# Patient Record
Sex: MALE | Race: WHITE | NOT HISPANIC OR LATINO | URBAN - NONMETROPOLITAN AREA
[De-identification: names, ages, dates, MRNs, and addresses within clinical notes are randomized per-mention and may not be internally consistent; named-entity substitution may affect disease eponyms.]

---

## 2021-04-13 ENCOUNTER — OFFICE VISIT (OUTPATIENT)
Dept: URBAN - NONMETROPOLITAN AREA CLINIC 2 | Facility: CLINIC | Age: 46
End: 2021-04-13
Payer: COMMERCIAL

## 2021-04-13 ENCOUNTER — LAB OUTSIDE AN ENCOUNTER (OUTPATIENT)
Dept: URBAN - NONMETROPOLITAN AREA CLINIC 2 | Facility: CLINIC | Age: 46
End: 2021-04-13

## 2021-04-13 DIAGNOSIS — R74.8 ABNORMAL LIVER ENZYMES: ICD-10-CM

## 2021-04-13 DIAGNOSIS — K76.0 FATTY LIVER: ICD-10-CM

## 2021-04-13 DIAGNOSIS — Z12.11 SCREENING FOR COLON CANCER: ICD-10-CM

## 2021-04-13 PROCEDURE — 99214 OFFICE O/P EST MOD 30 MIN: CPT | Performed by: INTERNAL MEDICINE

## 2021-04-13 RX ORDER — GEMFIBROZIL 600 MG/1
TAKE 1 TABLET (600 MG) BY ORAL ROUTE 2 TIMES PER DAY 30 MINUTES BEFORE MORNING AND EVENING MEAL TABLET, FILM COATED ORAL 2
Qty: 0 | Refills: 0 | Status: ON HOLD | COMMUNITY
Start: 1900-01-01

## 2021-04-13 RX ORDER — ALLOPURINOL 100 MG/1
TAKE 1 TABLET (100 MG) BY ORAL ROUTE ONCE DAILY TABLET ORAL 1
Qty: 0 | Refills: 0 | Status: ACTIVE | COMMUNITY
Start: 1900-01-01

## 2021-04-13 RX ORDER — LISINOPRIL 20 MG/1
TAKE 1 TABLET (20 MG) BY ORAL ROUTE ONCE DAILY TABLET ORAL 1
Qty: 0 | Refills: 0 | Status: ACTIVE | COMMUNITY
Start: 1900-01-01

## 2021-04-13 RX ORDER — SODIUM PICOSULFATE, MAGNESIUM OXIDE, AND ANHYDROUS CITRIC ACID 10; 3.5; 12 MG/160ML; G/160ML; G/160ML
160 ML LIQUID ORAL
Qty: 320 MILLILITER | Refills: 0 | OUTPATIENT
Start: 2021-04-13 | End: 2021-04-14

## 2021-04-13 NOTE — HPI-TODAY'S VISIT:
4-13-21 The patient presents today for follow-up of his elevated liver enzymes secondary to fatty liver.  Since our last visit, he has not had his blood work rechecked.  He has not lost any weight, and he is unsure if he thinks his liver enzymes have improved any.  He has been exercising.  We have discussed repeating his labs today, and he is willing to do this again today.  Overall, he is feeling well.  We have discussed his colonoscopy.  He is willing to proceed with this as well.  He denies any family history of colon cancer or colon polyps.  He denies any problems with his bowels.

## 2021-04-28 ENCOUNTER — OFFICE VISIT (OUTPATIENT)
Dept: URBAN - NONMETROPOLITAN AREA SURGERY CENTER 1 | Facility: SURGERY CENTER | Age: 46
End: 2021-04-28

## 2021-05-19 ENCOUNTER — OFFICE VISIT (OUTPATIENT)
Dept: URBAN - NONMETROPOLITAN AREA SURGERY CENTER 1 | Facility: SURGERY CENTER | Age: 46
End: 2021-05-19

## 2021-05-27 ENCOUNTER — TELEPHONE ENCOUNTER (OUTPATIENT)
Dept: URBAN - NONMETROPOLITAN AREA CLINIC 2 | Facility: CLINIC | Age: 46
End: 2021-05-27

## 2021-09-14 ENCOUNTER — OFFICE VISIT (OUTPATIENT)
Dept: URBAN - NONMETROPOLITAN AREA CLINIC 2 | Facility: CLINIC | Age: 46
End: 2021-09-14

## 2022-01-04 ENCOUNTER — LAB OUTSIDE AN ENCOUNTER (OUTPATIENT)
Dept: URBAN - NONMETROPOLITAN AREA CLINIC 2 | Facility: CLINIC | Age: 47
End: 2022-01-04

## 2022-01-04 ENCOUNTER — WEB ENCOUNTER (OUTPATIENT)
Dept: URBAN - NONMETROPOLITAN AREA CLINIC 2 | Facility: CLINIC | Age: 47
End: 2022-01-04

## 2022-01-04 ENCOUNTER — OFFICE VISIT (OUTPATIENT)
Dept: URBAN - NONMETROPOLITAN AREA CLINIC 2 | Facility: CLINIC | Age: 47
End: 2022-01-04
Payer: COMMERCIAL

## 2022-01-04 DIAGNOSIS — K76.0 FATTY LIVER: ICD-10-CM

## 2022-01-04 DIAGNOSIS — R74.8 ABNORMAL LIVER ENZYMES: ICD-10-CM

## 2022-01-04 DIAGNOSIS — K62.5 RECTAL BLEEDING: ICD-10-CM

## 2022-01-04 DIAGNOSIS — Z12.11 SCREENING FOR COLON CANCER: ICD-10-CM

## 2022-01-04 PROCEDURE — 99214 OFFICE O/P EST MOD 30 MIN: CPT | Performed by: INTERNAL MEDICINE

## 2022-01-04 RX ORDER — ALLOPURINOL 100 MG/1
TAKE 1 TABLET (100 MG) BY ORAL ROUTE ONCE DAILY TABLET ORAL 1
Qty: 0 | Refills: 0 | Status: ACTIVE | COMMUNITY
Start: 1900-01-01

## 2022-01-04 RX ORDER — LISINOPRIL 20 MG/1
TAKE 1 TABLET (20 MG) BY ORAL ROUTE ONCE DAILY TABLET ORAL 1
Qty: 0 | Refills: 0 | Status: ACTIVE | COMMUNITY
Start: 1900-01-01

## 2022-01-04 RX ORDER — GEMFIBROZIL 600 MG/1
TAKE 1 TABLET (600 MG) BY ORAL ROUTE 2 TIMES PER DAY 30 MINUTES BEFORE MORNING AND EVENING MEAL TABLET, FILM COATED ORAL 2
Qty: 0 | Refills: 0 | Status: ON HOLD | COMMUNITY
Start: 1900-01-01

## 2022-01-04 NOTE — HPI-TODAY'S VISIT:
4-13-21 The patient presents today for follow-up of his elevated liver enzymes secondary to fatty liver.  Since our last visit, he has not had his blood work rechecked.  He has not lost any weight, and he is unsure if he thinks his liver enzymes have improved any.  He has been exercising.  We have discussed repeating his labs today, and he is willing to do this again today.  Overall, he is feeling well.  We have discussed his colonoscopy.  He is willing to proceed with this as well.  He denies any family history of colon cancer or colon polyps.  He denies any problems with his bowels. 1/4/2022 The patient presents today for follow-up.  He started having some rectal bleeding at the beginning of the fall.  This lasted for approximately 10 days, and now it has resolved.  We did try to schedule his colonoscopy on his last visit, but his insurance would not cover screening before the age of 50.  He now has different insurance.  His bowels have been fairly regular.  He denies any blood in his stool currently.  He denies any diarrhea or constipation.  He also is due for blood work today due to his fatty liver.  He admits to drinking alcohol, and is worried his labs may be elevated.  We are going to check these today as well.  He will follow-up after his colonoscopy.  We have explained the risks and benefits of the procedure, and he does wish to proceed.

## 2022-01-05 ENCOUNTER — TELEPHONE ENCOUNTER (OUTPATIENT)
Dept: URBAN - METROPOLITAN AREA CLINIC 92 | Facility: CLINIC | Age: 47
End: 2022-01-05

## 2022-01-05 LAB
ALBUMIN: 3.9
ALKALINE PHOSPHATASE: 86
ALT (SGPT): 94
AST (SGOT): 96
BASO (ABSOLUTE): 0.1
BASOS: 1
BILIRUBIN, DIRECT: 0.16
BILIRUBIN, TOTAL: 0.6
BUN/CREATININE RATIO: 15
BUN: 14
CARBON DIOXIDE, TOTAL: 21
CHLORIDE: 99
CHOLESTEROL, TOTAL: 268
COMMENT:: (no result)
CREATININE: 0.95
EGFR IF AFRICN AM: 110
EGFR IF NONAFRICN AM: 95
EOS (ABSOLUTE): 0.1
EOS: 3
GLUCOSE: 130
HDL CHOLESTEROL: 21
HEMATOCRIT: 41.6
HEMATOLOGY COMMENTS:: (no result)
HEMOGLOBIN: 14.4
IMMATURE CELLS: (no result)
IMMATURE GRANS (ABS): 0.1
IMMATURE GRANULOCYTES: 2
LDL CHOL CALC (NIH): 163
LDL/HDL RATIO: 7.8
LYMPHS (ABSOLUTE): 1.1
LYMPHS: 21
MCH: 32
MCHC: 34.6
MCV: 92
MONOCYTES(ABSOLUTE): 0.5
MONOCYTES: 11
NEUTROPHILS (ABSOLUTE): 3
NEUTROPHILS: 62
NRBC: (no result)
PLATELETS: 286
POTASSIUM: 4.1
PROTEIN, TOTAL: 7
RBC: 4.5
RDW: 12.6
SODIUM: 138
TRIGLYCERIDES: 433
VLDL CHOLESTEROL CAL: 84
WBC: 4.9

## 2022-01-19 ENCOUNTER — OFFICE VISIT (OUTPATIENT)
Dept: URBAN - NONMETROPOLITAN AREA SURGERY CENTER 1 | Facility: SURGERY CENTER | Age: 47
End: 2022-01-19

## 2022-03-07 ENCOUNTER — OFFICE VISIT (OUTPATIENT)
Dept: URBAN - NONMETROPOLITAN AREA CLINIC 2 | Facility: CLINIC | Age: 47
End: 2022-03-07

## 2022-04-06 ENCOUNTER — OFFICE VISIT (OUTPATIENT)
Dept: URBAN - NONMETROPOLITAN AREA SURGERY CENTER 1 | Facility: SURGERY CENTER | Age: 47
End: 2022-04-06
Payer: COMMERCIAL

## 2022-04-06 ENCOUNTER — CLAIMS CREATED FROM THE CLAIM WINDOW (OUTPATIENT)
Dept: URBAN - METROPOLITAN AREA CLINIC 4 | Facility: CLINIC | Age: 47
End: 2022-04-06
Payer: COMMERCIAL

## 2022-04-06 DIAGNOSIS — D12.4 BENIGN NEOPLASM OF DESCENDING COLON: ICD-10-CM

## 2022-04-06 DIAGNOSIS — D12.2 ADENOMA OF ASCENDING COLON: ICD-10-CM

## 2022-04-06 DIAGNOSIS — K63.89 CYST OF DUODENUM: ICD-10-CM

## 2022-04-06 DIAGNOSIS — D12.4 ADENOMA OF DESCENDING COLON: ICD-10-CM

## 2022-04-06 DIAGNOSIS — D12.2 BENIGN NEOPLASM OF ASCENDING COLON: ICD-10-CM

## 2022-04-06 DIAGNOSIS — Z12.11 COLON CANCER SCREENING: ICD-10-CM

## 2022-04-06 PROCEDURE — G8907 PT DOC NO EVENTS ON DISCHARG: HCPCS | Performed by: INTERNAL MEDICINE

## 2022-04-06 PROCEDURE — 45385 COLONOSCOPY W/LESION REMOVAL: CPT | Performed by: INTERNAL MEDICINE

## 2022-04-06 PROCEDURE — 88305 TISSUE EXAM BY PATHOLOGIST: CPT | Performed by: PATHOLOGY

## 2022-04-13 ENCOUNTER — LAB OUTSIDE AN ENCOUNTER (OUTPATIENT)
Dept: URBAN - METROPOLITAN AREA CLINIC 92 | Facility: CLINIC | Age: 47
End: 2022-04-13

## 2022-06-24 ENCOUNTER — OFFICE VISIT (OUTPATIENT)
Dept: URBAN - NONMETROPOLITAN AREA CLINIC 2 | Facility: CLINIC | Age: 47
End: 2022-06-24

## 2022-10-25 ENCOUNTER — OFFICE VISIT (OUTPATIENT)
Dept: URBAN - NONMETROPOLITAN AREA CLINIC 2 | Facility: CLINIC | Age: 47
End: 2022-10-25
Payer: COMMERCIAL

## 2022-10-25 ENCOUNTER — TELEPHONE ENCOUNTER (OUTPATIENT)
Dept: URBAN - METROPOLITAN AREA CLINIC 92 | Facility: CLINIC | Age: 47
End: 2022-10-25

## 2022-10-25 VITALS
HEIGHT: 73 IN | TEMPERATURE: 97.8 F | HEART RATE: 69 BPM | BODY MASS INDEX: 32.47 KG/M2 | DIASTOLIC BLOOD PRESSURE: 78 MMHG | SYSTOLIC BLOOD PRESSURE: 123 MMHG | WEIGHT: 245 LBS

## 2022-10-25 DIAGNOSIS — Z12.11 SCREENING FOR COLON CANCER: ICD-10-CM

## 2022-10-25 DIAGNOSIS — K62.5 RECTAL BLEEDING: ICD-10-CM

## 2022-10-25 DIAGNOSIS — R74.8 ABNORMAL LIVER ENZYMES: ICD-10-CM

## 2022-10-25 DIAGNOSIS — K76.0 FATTY LIVER: ICD-10-CM

## 2022-10-25 DIAGNOSIS — Z86.010 PERSONAL HISTORY OF COLONIC POLYPS: ICD-10-CM

## 2022-10-25 PROBLEM — 428283002: Status: ACTIVE | Noted: 2022-10-25

## 2022-10-25 PROBLEM — 12063002: Status: ACTIVE | Noted: 2022-01-04

## 2022-10-25 PROCEDURE — 99214 OFFICE O/P EST MOD 30 MIN: CPT | Performed by: NURSE PRACTITIONER

## 2022-10-25 RX ORDER — ALLOPURINOL 100 MG/1
TAKE 1 TABLET (100 MG) BY ORAL ROUTE ONCE DAILY TABLET ORAL 1
Qty: 0 | Refills: 0 | Status: ACTIVE | COMMUNITY
Start: 1900-01-01

## 2022-10-25 RX ORDER — GEMFIBROZIL 600 MG/1
TAKE 1 TABLET (600 MG) BY ORAL ROUTE 2 TIMES PER DAY 30 MINUTES BEFORE MORNING AND EVENING MEAL TABLET, FILM COATED ORAL 2
Qty: 0 | Refills: 0 | Status: ON HOLD | COMMUNITY
Start: 1900-01-01

## 2022-10-25 RX ORDER — LISINOPRIL 20 MG/1
TAKE 1 TABLET (20 MG) BY ORAL ROUTE ONCE DAILY TABLET ORAL 1
Qty: 0 | Refills: 0 | Status: ACTIVE | COMMUNITY
Start: 1900-01-01

## 2022-10-25 NOTE — HPI-TODAY'S VISIT:
4-13-21 The patient presents today for follow-up of his elevated liver enzymes secondary to fatty liver.  Since our last visit, he has not had his blood work rechecked.  He has not lost any weight, and he is unsure if he thinks his liver enzymes have improved any.  He has been exercising.  We have discussed repeating his labs today, and he is willing to do this again today.  Overall, he is feeling well.  We have discussed his colonoscopy.  He is willing to proceed with this as well.  He denies any family history of colon cancer or colon polyps.  He denies any problems with his bowels. 1/4/2022 The patient presents today for follow-up.  He started having some rectal bleeding at the beginning of the fall.  This lasted for approximately 10 days, and now it has resolved.  We did try to schedule his colonoscopy on his last visit, but his insurance would not cover screening before the age of 50.  He now has different insurance.  His bowels have been fairly regular.  He denies any blood in his stool currently.  He denies any diarrhea or constipation.  He also is due for blood work today due to his fatty liver.  He admits to drinking alcohol, and is worried his labs may be elevated.  We are going to check these today as well.  He will follow-up after his colonoscopy.  We have explained the risks and benefits of the procedure, and he does wish to proceed. 10/25/2022 Mr. Bledsoe presents for follow-up of fatty liver.  Since his last visit he is lost approximately 10 pounds.  He had repeat labs done last month by Dr. De La O, he states that these has improved.  His colonoscopy reveals multiple tubular adenomas and hyperplastic polyps, he is due for repeat in 3 years.  Today he is doing well with no new GI complaints, he will continue to work on weight loss.  MB

## 2023-02-16 ENCOUNTER — LAB OUTSIDE AN ENCOUNTER (OUTPATIENT)
Dept: URBAN - METROPOLITAN AREA CLINIC 92 | Facility: CLINIC | Age: 48
End: 2023-02-16

## 2023-04-25 ENCOUNTER — OFFICE VISIT (OUTPATIENT)
Dept: URBAN - NONMETROPOLITAN AREA CLINIC 2 | Facility: CLINIC | Age: 48
End: 2023-04-25
Payer: COMMERCIAL

## 2023-04-25 ENCOUNTER — LAB OUTSIDE AN ENCOUNTER (OUTPATIENT)
Dept: URBAN - NONMETROPOLITAN AREA CLINIC 2 | Facility: CLINIC | Age: 48
End: 2023-04-25

## 2023-04-25 VITALS
HEIGHT: 73 IN | HEART RATE: 77 BPM | WEIGHT: 245 LBS | BODY MASS INDEX: 32.47 KG/M2 | SYSTOLIC BLOOD PRESSURE: 130 MMHG | DIASTOLIC BLOOD PRESSURE: 84 MMHG

## 2023-04-25 DIAGNOSIS — K76.0 FATTY LIVER: ICD-10-CM

## 2023-04-25 DIAGNOSIS — D50.8 OTHER IRON DEFICIENCY ANEMIA: ICD-10-CM

## 2023-04-25 DIAGNOSIS — R74.8 ABNORMAL LIVER ENZYMES: ICD-10-CM

## 2023-04-25 DIAGNOSIS — K62.5 RECTAL BLEEDING: ICD-10-CM

## 2023-04-25 DIAGNOSIS — Z86.010 PERSONAL HISTORY OF COLONIC POLYPS: ICD-10-CM

## 2023-04-25 PROCEDURE — 99214 OFFICE O/P EST MOD 30 MIN: CPT | Performed by: NURSE PRACTITIONER

## 2023-04-25 RX ORDER — LISINOPRIL 20 MG/1
TAKE 1 TABLET (20 MG) BY ORAL ROUTE ONCE DAILY TABLET ORAL 1
Qty: 0 | Refills: 0 | Status: ACTIVE | COMMUNITY
Start: 1900-01-01

## 2023-04-25 RX ORDER — ALLOPURINOL 100 MG/1
TAKE 1 TABLET (100 MG) BY ORAL ROUTE ONCE DAILY TABLET ORAL 1
Qty: 0 | Refills: 0 | Status: ACTIVE | COMMUNITY
Start: 1900-01-01

## 2023-04-25 RX ORDER — GEMFIBROZIL 600 MG/1
TAKE 1 TABLET (600 MG) BY ORAL ROUTE 2 TIMES PER DAY 30 MINUTES BEFORE MORNING AND EVENING MEAL TABLET, FILM COATED ORAL 2
Qty: 0 | Refills: 0 | Status: ON HOLD | COMMUNITY
Start: 1900-01-01

## 2023-04-25 NOTE — HPI-TODAY'S VISIT:
4-13-21 The patient presents today for follow-up of his elevated liver enzymes secondary to fatty liver.  Since our last visit, he has not had his blood work rechecked.  He has not lost any weight, and he is unsure if he thinks his liver enzymes have improved any.  He has been exercising.  We have discussed repeating his labs today, and he is willing to do this again today.  Overall, he is feeling well.  We have discussed his colonoscopy.  He is willing to proceed with this as well.  He denies any family history of colon cancer or colon polyps.  He denies any problems with his bowels. 1/4/2022 The patient presents today for follow-up.  He started having some rectal bleeding at the beginning of the fall.  This lasted for approximately 10 days, and now it has resolved.  We did try to schedule his colonoscopy on his last visit, but his insurance would not cover screening before the age of 50.  He now has different insurance.  His bowels have been fairly regular.  He denies any blood in his stool currently.  He denies any diarrhea or constipation.  He also is due for blood work today due to his fatty liver.  He admits to drinking alcohol, and is worried his labs may be elevated.  We are going to check these today as well.  He will follow-up after his colonoscopy.  We have explained the risks and benefits of the procedure, and he does wish to proceed. 10/25/2022 Mr. Bledsoe presents for follow-up of fatty liver.  Since his last visit he is lost approximately 10 pounds.  He had repeat labs done last month by Dr. De La O, he states that these has improved.  His colonoscopy reveals multiple tubular adenomas and hyperplastic polyps, he is due for repeat in 3 years.  Today he is doing well with no new GI complaints, he will continue to work on weight loss.  MB 4/25/2023 Graeme presents for follow-up of elevated liver enzymes.  Since his last visit he stopped drinking alcohol completely.  His liver enzymes were in the 200s and September of last year, they came down to the 90s in January.  He has been working on weight loss.  His bowels are moving regularly.  His colonoscopy last year revealed multiple tubular adenomas, he denies any reflux symptoms.  He was mildly anemic on last labs with active alcohol use.  Today he agrees to repeat labs, if he still is anemic he agrees to pursue EGD.  If his liver enzymes are still elevated we will pursue repeat ultrasound imaging of his liver.  For now have recommended that he continue to abstain from alcohol, if his liver enzymes are normal okay to use socially.  MB

## 2023-05-09 ENCOUNTER — WEB ENCOUNTER (OUTPATIENT)
Dept: URBAN - NONMETROPOLITAN AREA CLINIC 2 | Facility: CLINIC | Age: 48
End: 2023-05-09

## 2023-10-18 ENCOUNTER — CLAIMS CREATED FROM THE CLAIM WINDOW (OUTPATIENT)
Dept: URBAN - METROPOLITAN AREA TELEHEALTH 2 | Facility: TELEHEALTH | Age: 48
End: 2023-10-18
Payer: COMMERCIAL

## 2023-10-18 ENCOUNTER — DASHBOARD ENCOUNTERS (OUTPATIENT)
Age: 48
End: 2023-10-18

## 2023-10-18 DIAGNOSIS — R74.8 ABNORMAL LIVER ENZYMES: ICD-10-CM

## 2023-10-18 DIAGNOSIS — K76.0 FATTY LIVER: ICD-10-CM

## 2023-10-18 DIAGNOSIS — Z86.010 PERSONAL HISTORY OF COLONIC POLYPS: ICD-10-CM

## 2023-10-18 DIAGNOSIS — K62.5 RECTAL BLEEDING: ICD-10-CM

## 2023-10-18 DIAGNOSIS — D50.8 OTHER IRON DEFICIENCY ANEMIA: ICD-10-CM

## 2023-10-18 PROBLEM — 87522002: Status: ACTIVE | Noted: 2022-11-16

## 2023-10-18 PROCEDURE — 99442 PHONE E/M BY PHYS 11-20 MIN: CPT | Performed by: NURSE PRACTITIONER

## 2023-10-18 RX ORDER — GEMFIBROZIL 600 MG/1
TAKE 1 TABLET (600 MG) BY ORAL ROUTE 2 TIMES PER DAY 30 MINUTES BEFORE MORNING AND EVENING MEAL TABLET, FILM COATED ORAL 2
Qty: 0 | Refills: 0 | Status: ON HOLD | COMMUNITY
Start: 1900-01-01

## 2023-10-18 RX ORDER — ALLOPURINOL 100 MG/1
TAKE 1 TABLET (100 MG) BY ORAL ROUTE ONCE DAILY TABLET ORAL 1
Qty: 0 | Refills: 0 | Status: ACTIVE | COMMUNITY
Start: 1900-01-01

## 2023-10-18 RX ORDER — LISINOPRIL 20 MG/1
TAKE 1 TABLET (20 MG) BY ORAL ROUTE ONCE DAILY TABLET ORAL 1
Qty: 0 | Refills: 0 | Status: ACTIVE | COMMUNITY
Start: 1900-01-01

## 2023-10-18 NOTE — HPI-TODAY'S VISIT:
4-13-21 The patient presents today for follow-up of his elevated liver enzymes secondary to fatty liver.  Since our last visit, he has not had his blood work rechecked.  He has not lost any weight, and he is unsure if he thinks his liver enzymes have improved any.  He has been exercising.  We have discussed repeating his labs today, and he is willing to do this again today.  Overall, he is feeling well.  We have discussed his colonoscopy.  He is willing to proceed with this as well.  He denies any family history of colon cancer or colon polyps.  He denies any problems with his bowels. 1/4/2022 The patient presents today for follow-up.  He started having some rectal bleeding at the beginning of the fall.  This lasted for approximately 10 days, and now it has resolved.  We did try to schedule his colonoscopy on his last visit, but his insurance would not cover screening before the age of 50.  He now has different insurance.  His bowels have been fairly regular.  He denies any blood in his stool currently.  He denies any diarrhea or constipation.  He also is due for blood work today due to his fatty liver.  He admits to drinking alcohol, and is worried his labs may be elevated.  We are going to check these today as well.  He will follow-up after his colonoscopy.  We have explained the risks and benefits of the procedure, and he does wish to proceed. 10/25/2022 Mr. Bledsoe presents for follow-up of fatty liver.  Since his last visit he is lost approximately 10 pounds.  He had repeat labs done last month by Dr. De La O, he states that these has improved.  His colonoscopy reveals multiple tubular adenomas and hyperplastic polyps, he is due for repeat in 3 years.  Today he is doing well with no new GI complaints, he will continue to work on weight loss.  MB 4/25/2023 Graeme presents for follow-up of elevated liver enzymes.  Since his last visit he stopped drinking alcohol completely.  His liver enzymes were in the 200s and September of last year, they came down to the 90s in January.  He has been working on weight loss.  His bowels are moving regularly.  His colonoscopy last year revealed multiple tubular adenomas, he denies any reflux symptoms.  He was mildly anemic on last labs with active alcohol use.  Today he agrees to repeat labs, if he still is anemic he agrees to pursue EGD.  If his liver enzymes are still elevated we will pursue repeat ultrasound imaging of his liver.  For now have recommended that he continue to abstain from alcohol, if his liver enzymes are normal okay to use socially.  MB 10/18/2023 Mr. Bledsoe presents for follow-up of elevated liver enzymes.  His repeat labs with Dr. Sánchez in July are completely normal.  He has been sober since his last visit and doing great.  His ultrasound in the past is shown fatty liver, he did not have repeat ultrasound was ordered last year.  Today he is doing well otherwise with no new GI complaints.  MB

## 2024-04-10 ENCOUNTER — OV EP (OUTPATIENT)
Dept: URBAN - NONMETROPOLITAN AREA CLINIC 13 | Facility: CLINIC | Age: 49
End: 2024-04-10